# Patient Record
Sex: MALE | Race: WHITE
[De-identification: names, ages, dates, MRNs, and addresses within clinical notes are randomized per-mention and may not be internally consistent; named-entity substitution may affect disease eponyms.]

---

## 2017-11-11 ENCOUNTER — HOSPITAL ENCOUNTER (INPATIENT)
Dept: HOSPITAL 45 - C.EDB | Age: 67
LOS: 2 days | Discharge: HOME HEALTH | DRG: 561 | End: 2017-11-13
Payer: COMMERCIAL

## 2017-11-11 VITALS
HEIGHT: 71 IN | WEIGHT: 216.27 LBS | HEIGHT: 71 IN | WEIGHT: 216.27 LBS | BODY MASS INDEX: 30.28 KG/M2 | BODY MASS INDEX: 30.28 KG/M2
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
## 2017-11-11 DIAGNOSIS — Z79.82: ICD-10-CM

## 2017-11-11 DIAGNOSIS — Z96.651: ICD-10-CM

## 2017-11-11 DIAGNOSIS — T84.020A: Primary | ICD-10-CM

## 2017-11-11 DIAGNOSIS — Z88.2: ICD-10-CM

## 2017-11-11 DIAGNOSIS — Z96.641: ICD-10-CM
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
## 2017-11-11 NOTE — EMERGENCY ROOM VISIT NOTE
History


Report prepared by Delaney:  Dorcas Rome


Under the Supervision of:  Dr. Isadora Rosas D.O.


First contact with patient:  23:19


Chief Complaint:  REFERRED BY DOCTOR


Stated Complaint:  RIGHT HIP/KNEE POSS OUT OF PLACE. HAD SURG 11/9





History of Present Illness


The patient is a 67 year old male who presents to the Emergency Room with 

complaints of a possible right hip and knee dislocation occurring this 

afternoon. The patient was going to the bathroom when he slipped and his right 

knee "went inward".  He denies falling to the ground or any trauma. The patient 

has a history of knee surgery a year ago and [facility] His hip was replaced because he had significant 

degeneration to the site. He has pain on the outside of his right knee which 

worsens when he puts weight on it. The patient last ate about 6 hours ago and 

notes taking tramadol and celeBREX today.  He denies having any other medical 

problems.





   Source of History:  patient


   Onset:  this afternoon


   Position:  knee (right), other (hip)


   Quality:  other (dislocation)


   Modifying Factors (Worsening):  other (putting weight on right leg)





Review of Systems


See HPI for pertinent positives & negatives. A total of 10 systems reviewed and 

were otherwise negative.





Past Medical & Surgical


Medical Problems:


(1) Degenerative joint disease of right hip


Surgical Problems:


(1) S/P TKR (total knee replacement)








Family History


no pertinent family history stated.





Social History


Smoking Status:  Never Smoker


Marital Status:  





Current/Historical Medications


Scheduled


Acetaminophen (Tylenol), 1,000 MG PO Q8


Ascorbic Acid (Vitamin C), 500 MG PO BID


Aspirin (Aspirin Ec), 81 MG PO BID


Atorvastatin (Lipitor), 40 MG PO QAM


Celecoxib (CeleBREX), 200 MG PO BID


Coenzyme Q10 (Ubidecarenone) (Coq-10), 100 MG PO QPM


Ezetimibe (Zetia), 10 MG PO QAM


Fish Oil (Omega-3), 1,200 MG PO BID


Glucosamine-Chondroitin-Vit C- (Glucosamine Chondroitin), 1 TAB PO BID


Multivitamin (Multivitamin), 1 TAB PO QPM


Ranolazine (Ranexa), 500 MG PO BID


Tamsulosin Hcl (Flomax), 0.4 MG PO QAM





Scheduled PRN


Ondansetron Hcl (Zofran), 8 MG PO Q8 PRN for Nausea


Tramadol (Ultram),  MG PO Q4H PRN for Pain





Allergies


Coded Allergies:  


     Iodine (Verified  Allergy, Unknown, BLISTERS WITH TOPICAL IODINE, 11/9/17)


 OK WITH IVP DYE AND CAN EAT SHELLFISH PER PT


     Penicillins (Verified  Allergy, Unknown, UNKNOWN, 11/9/17)


     Sulfa Antibiotics (Verified  Allergy, Unknown, UNKNOWN, 11/9/17)





Physical Exam


Vital Signs











  Date Time  Temp Pulse Resp B/P (MAP) Pulse Ox O2 Delivery O2 Flow Rate FiO2


 


11/11/17 23:08 36.4 68 18 135/76 98 Room Air  











Physical Exam


HEENT: Head - normocephalic and atraumatic   Pupils are equal, round, and 

reactive to light.  Extraocular eye muscles are intact, and sclera are 

anicteric.   Nose -  moist nasal mucosa without discharge. Mouth - moist buccal 

mucosa.  Oropharynx is nonerythematous and there is no tonsillar exudate or 

edema noted.


Neck: Supple; no JVD, nuchal rigidity, cervical lymphadenopathy, or auscultated 

bruits.


Heart: Regular rate and rhythm.  There is a normal S1 and S2 with no murmurs, 

clicks, or gallops appreciated.


Lungs: Clear to auscultation bilaterally with no wheezes, rales, or rhonchi.


Abdomen: Soft, completely nontender, nondistended, with good bowel sounds.  

There are no palpable pulsatile masses or hepatosplenomegaly.  There is no 

guarding, rigidity, or rebound noted.


Extremities: Right lower extremity shortened and turned inward. Pain to 

proximal tibia and distal femur. No evidence of cyanosis, clubbing, or edema.  

There are easily palpable peripheral pulses.


Skin: warm and dry with good turgor and no rashes.





Medical Decision & Procedures


ER Provider


Diagnostic Interpretation:


Radiology results as stated below per my review and the radiologist's 

interpretation: 





RIGHT HIP XRAY: Dislocated prothesis. 





RIGHT KNEE XRAY: unremarkable, hardware in place, no fracture.





Laboratory Results


Laboratory results per my review.





ED Course


2321: Past medical records reviewed. The patient was evaluated in room B9. A 

complete history and physical exam was performed.  The patient went for plain 

films of his right knee and his right hip as described above.





0015: I updated the patient on his X-ray results.  He declined wanting anything 

for pain.





0020: Discussed the patient's case with Dr. [facility]. The 

patient will be evaluated for further management.





Medical Decision


The patient is a 67 year old male who presents to the Emergency Room with 

complaints of a possible right hip and knee dislocation occurring this 

afternoon. Differential diagnosis includes prosthetic hip replacement, knee 

fracture, knee dislocation. 





The patient was discharged from the hospital today after having a right hip 

replacement.  Unfortunately, the patient has dislocated his prosthesis.  I 

discussed the case with Dr. Waddell and he states the patient to the OR this 

morning.  The patient will be kept nothing by mouth.





Medication Reconcilliation


Current Medication List:  was personally reviewed by me





Blood Pressure Screening


Patient's blood pressure:  Elevated blood pressure


Blood pressure disposition:  Referred to PCP (evaluated by hospitalist)





Consults


Time Called:  0017


Consulting Physician:  Dr. [facility]


Returned Call:  0020


Discussed the patient's case with Dr. Vic New. The 

patient will be evaluated for further management.





Impression





 Primary Impression:  


 Dislocation of hip joint prosthesis





Scribe Attestation


The scribe's documentation has been prepared under my direction and personally 

reviewed by me in its entirety. I confirm that the note above accurately 

reflects all work, treatment, procedures, and medical decision making performed 

by me.





Departure Information


Dispostion


Being Evaluated By Hospitalist





Referrals


Constantine Greenberg M.D. (PCP)





Patient Instructions


[facility]





Problem Qualifiers








 Primary Impression:  


 Dislocation of hip joint prosthesis


 Encounter type:  initial encounter  Qualified Codes:  T84.029A - Dislocation 

of unspecified internal joint prosthesis, initial encounter; Z96.649 - Presence 

of unspecified artificial hip joint

## 2017-11-12 VITALS
TEMPERATURE: 98.06 F | SYSTOLIC BLOOD PRESSURE: 146 MMHG | DIASTOLIC BLOOD PRESSURE: 77 MMHG | OXYGEN SATURATION: 96 % | HEART RATE: 77 BPM

## 2017-11-12 VITALS
TEMPERATURE: 97.7 F | DIASTOLIC BLOOD PRESSURE: 79 MMHG | SYSTOLIC BLOOD PRESSURE: 133 MMHG | OXYGEN SATURATION: 96 % | HEART RATE: 74 BPM

## 2017-11-12 VITALS
SYSTOLIC BLOOD PRESSURE: 160 MMHG | OXYGEN SATURATION: 99 % | DIASTOLIC BLOOD PRESSURE: 80 MMHG | HEART RATE: 75 BPM | TEMPERATURE: 97.52 F

## 2017-11-12 VITALS — DIASTOLIC BLOOD PRESSURE: 80 MMHG | HEART RATE: 79 BPM | SYSTOLIC BLOOD PRESSURE: 147 MMHG | OXYGEN SATURATION: 98 %

## 2017-11-12 VITALS
DIASTOLIC BLOOD PRESSURE: 69 MMHG | TEMPERATURE: 97.34 F | SYSTOLIC BLOOD PRESSURE: 118 MMHG | HEART RATE: 71 BPM | OXYGEN SATURATION: 97 %

## 2017-11-12 VITALS
SYSTOLIC BLOOD PRESSURE: 145 MMHG | OXYGEN SATURATION: 99 % | HEART RATE: 64 BPM | TEMPERATURE: 97.52 F | DIASTOLIC BLOOD PRESSURE: 81 MMHG

## 2017-11-12 VITALS
OXYGEN SATURATION: 98 % | SYSTOLIC BLOOD PRESSURE: 123 MMHG | DIASTOLIC BLOOD PRESSURE: 74 MMHG | TEMPERATURE: 97.88 F | HEART RATE: 65 BPM

## 2017-11-12 VITALS
SYSTOLIC BLOOD PRESSURE: 134 MMHG | TEMPERATURE: 98.06 F | HEART RATE: 70 BPM | DIASTOLIC BLOOD PRESSURE: 80 MMHG | OXYGEN SATURATION: 97 %

## 2017-11-12 VITALS — SYSTOLIC BLOOD PRESSURE: 119 MMHG | DIASTOLIC BLOOD PRESSURE: 76 MMHG | OXYGEN SATURATION: 97 % | HEART RATE: 64 BPM

## 2017-11-12 LAB
BASOPHILS # BLD: 0.03 K/UL (ref 0–0.2)
BASOPHILS NFR BLD: 0.3 %
EOS ABS #: 0.16 K/UL (ref 0–0.5)
EOSINOPHIL NFR BLD AUTO: 221 K/UL (ref 130–400)
HCT VFR BLD CALC: 41.5 % (ref 42–52)
HGB BLD-MCNC: 13.7 G/DL (ref 14–18)
IG#: 0.05 K/UL (ref 0–0.02)
IMM GRANULOCYTES NFR BLD AUTO: 20.9 %
INR PPP: 0.9 (ref 0.9–1.1)
LYMPHOCYTES # BLD: 2.21 K/UL (ref 1.2–3.4)
MCH RBC QN AUTO: 29 PG (ref 25–34)
MCHC RBC AUTO-ENTMCNC: 33 G/DL (ref 32–36)
MCV RBC AUTO: 87.7 FL (ref 80–100)
MONO ABS #: 1.18 K/UL (ref 0.11–0.59)
MONOCYTES NFR BLD: 11.2 %
NEUT ABS #: 6.94 K/UL (ref 1.4–6.5)
NEUTROPHILS # BLD AUTO: 1.5 %
NEUTROPHILS NFR BLD AUTO: 65.6 %
PMV BLD AUTO: 9.9 FL (ref 7.4–10.4)
PTT PATIENT: 26.3 SECONDS (ref 21–31)
RED CELL DISTRIBUTION WIDTH CV: 17.1 % (ref 11.5–14.5)
RED CELL DISTRIBUTION WIDTH SD: 54.7 FL (ref 36.4–46.3)
WBC # BLD AUTO: 10.57 K/UL (ref 4.8–10.8)

## 2017-11-12 PROCEDURE — 0SS9XZZ REPOSITION RIGHT HIP JOINT, EXTERNAL APPROACH: ICD-10-PCS

## 2017-11-12 RX ADMIN — Medication SCH GM: at 20:38

## 2017-11-12 RX ADMIN — SODIUM CHLORIDE SCH MLS/HR: 900 INJECTION, SOLUTION INTRAVENOUS at 01:41

## 2017-11-12 RX ADMIN — OXYCODONE HYDROCHLORIDE AND ACETAMINOPHEN SCH MG: 500 TABLET ORAL at 20:38

## 2017-11-12 RX ADMIN — CELECOXIB SCH MG: 200 CAPSULE ORAL at 20:39

## 2017-11-12 RX ADMIN — RANOLAZINE SCH MG: 500 TABLET, FILM COATED, EXTENDED RELEASE ORAL at 20:38

## 2017-11-12 RX ADMIN — SODIUM CHLORIDE SCH MLS/HR: 900 INJECTION, SOLUTION INTRAVENOUS at 10:03

## 2017-11-12 RX ADMIN — SODIUM CHLORIDE SCH MLS/HR: 900 INJECTION, SOLUTION INTRAVENOUS at 17:52

## 2017-11-12 RX ADMIN — Medication SCH MG: at 20:38

## 2017-11-12 NOTE — OPERATIVE REPORT
DATE OF OPERATION:  11/12/2017

 

PREOPERATIVE DIAGNOSIS:  Right dislocated prosthetic hip.

 

POSTOPERATIVE DIAGNOSIS:  Same.

 

PROCEDURE:  Right closed reduction prosthetic hip dislocation.

 

SURGEON:  Jimmy Waddell DO

 

FIRST ASSISTANT:  None.

 

ANESTHESIA:  General mask with monitored anesthesia care.

 

SPECIMENS:  None.

 

DRAINS:  None.

 

COMPLICATIONS:  None.

 

BLOOD LOSS:  Zero.

 

PERTINENT HISTORY:  This is a 67-year-old gentleman who had a right total hip

replacement performed by Dr. Jessica on Thursday of this week, which was 3

days ago.  The patient had been discharged from the hospital yesterday, which

was postop day #2 with no difficulties, no acute distress and good progress

with physical therapy.  The patient then noted some discomfort and pain with

inability to extend or flex the right hip with apparent shortening of the

right lower extremity when he returned back to his home in [place].  The

patient had been getting on to or off of the toilet and at that point he

noticed pain in the hip and the knee and difficulty with range of motion.  He

was then instructed to follow up at [facility] for further

care and management.  He presented to the ER, had x-rays, noted dislocation

of the right hip, and was then admitted to the hospital for preparation for

closed reduction, potential surgical intervention.

 

All potential risks, benefits, complications, alternatives, rehab, potential

for incomplete relief of symptoms, need for further surgery, DVT, PE, death,

persistent pain, swelling, scarring, weakness, neurovascular injury, wound

complications, possible bone fracture, possible damage to the prosthetic

implants or need for an open reduction was discussed with the patient and his

wife.  They both decided to proceed with the procedure as indicated.

 

DESCRIPTION OF PROCEDURE:  The patient was taken to the operative suite,

placed supine on the operating room table.  After review of the consent and

identification of proper operative site, the patient was then anesthetized

and mask airway was maintained with monitored anesthesia care.  Next, the

right hip was then assessed and noted to be shortened and internally rotated

compared to the nonoperative left hip.  Next, a gentle reduction maneuver

under steady gentle traction was then performed with a palpable clunk with

reduction of the right hip prosthesis.  Next, range of motion was attempted

and noted to be markedly improved with full range of motion with no

impingement.  Post-reduction radiographs intraoperatively demonstrated

concentric reduction of the femoral head component into the acetabular

component in both AP and cross-table lateral projections.  There was no

evidence of any bone fracture, rotation of the implants or loosening of the

implants.  Next, an abduction pillow brace was placed between the patient's

legs.  He was awakened and then taken to recovery in stable condition.

 

 

I attest to the content of the Intraoperative Record and any orders documented therein. Any exception
s are noted below.

## 2017-11-12 NOTE — DIAGNOSTIC IMAGING REPORT
R KNEE 1 OR 2 VIEWS ROUTINE



HISTORY:  67 years-old Male eval for trauma acute right knee pain status post

trauma



COMPARISON: Right knee radiographs 12/20/2016



TECHNIQUE: AP and lateral views of the right knee



FINDINGS: 

Right hip arthroplasty and patellar resurfacing changes noted without hardware

complication identified. Marginal spurring is seen about the patella. There is a

moderate-sized joint effusion. No acute fracture or dislocation. Negative for

opaque foreign body. Mild soft tissue swelling about the knee.



IMPRESSION: 

1. Mild soft tissue swelling and moderate joint effusion without acute fracture

or dislocation.

2. Right knee arthroplasty without complication. 







The above report was generated using voice recognition software. It may contain

grammatical, syntax or spelling errors.







Electronically signed by:  Kurt Corrales M.D.

11/12/2017 6:27 AM



Dictated Date/Time:  11/12/2017 6:26 AM

## 2017-11-12 NOTE — HISTORY AND PHYSICAL
History & Physical


Date


Nov 12, 2017.





Chief Complaint


Right hip pain





History of Present Illness


The patient is a 67 year old male with complaints of right hip pain and leg 

rotation after sitting on his toilet last evening. He had a right STEPHANIE performed 

by Dr. Jessica on 11.9.17 and was discharged  yesterday 11.11.17. He considered 

putting on the elevated toilet seat, however, he thought the seat would be high 

enough. After raising himself from the toilet, he was unable to ambulate and he 

had pain in the posterior right hip and the right knee. His wife brought him to 

[facility] ER where x-rays confirmed the dislocated right STEPHANIE.





Past Medical/Surgical History


Medical Problems:


(1) Degenerative joint disease of right hip


Surgical Problems:


(1) S/P TKR (total knee replacement)





Allergies


Coded Allergies:  


     Iodine (Verified  Allergy, Unknown, BLISTERS WITH TOPICAL IODINE, 11/9/17)


 OK WITH IVP DYE AND CAN EAT SHELLFISH PER PT


     Penicillins (Verified  Allergy, Unknown, UNKNOWN, 11/9/17)


     Sulfa Antibiotics (Verified  Allergy, Unknown, UNKNOWN, 11/9/17)





Home Medications


Scheduled


Acetaminophen (Tylenol), 1,000 MG PO Q8


Ascorbic Acid (Vitamin C), 500 MG PO BID


Aspirin (Aspirin Ec), 81 MG PO BID


Atorvastatin (Lipitor), 40 MG PO QAM


Celecoxib (CeleBREX), 200 MG PO BID


Coenzyme Q10 (Ubidecarenone) (Coq-10), 100 MG PO QPM


Ezetimibe (Zetia), 10 MG PO QAM


Fish Oil (Omega-3), 1,200 MG PO BID


Glucosamine-Chondroitin-Vit C- (Glucosamine Chondroitin), 1 TAB PO BID


Multivitamin (Multivitamin), 1 TAB PO QPM


Ranolazine (Ranexa), 500 MG PO BID


Tamsulosin Hcl (Flomax), 0.4 MG PO QAM





Scheduled PRN


Ondansetron Hcl (Zofran), 8 MG PO Q8 PRN for Nausea


Tramadol (Ultram),  MG PO Q4H PRN for Pain





Physical Examination


Skin:  warm/dry, no rash


Eyes:  normal inspection


ENT:  normal ENT inspection


Head:  normocephalic, atraumatic


Neck:  supple, trachea midline


Respiratory/Chest:  lungs clear, normal breath sounds, no respiratory distress


Cardiovascular:  regular rate, rhythm, no murmur


Abdomen / GI:  normal bowel sounds, non tender


Neurologic/Psych:  no motor/sensory deficits, alert, oriented x 3





Diagnosis


Dislocated right STEPHANIE


3 days post op right STEPHANIE





Plan of Treatment


Recommend a closed reduction right STEPHANIE dislocation.


All potential risks, benefits, complications, alternatives, and rehab have been 

discussed with the patient and he wishes to proceed.


He will be taken to the OR today for reduction.

## 2017-11-12 NOTE — ANESTHESIOLOGY PROGRESS NOTE
Anesthesia Post Op Note


Date & Time


Nov 12, 2017 at 13:10





Vital Signs


Pain Intensity:  0.0





Vital Signs Past 12 Hours








  Date Time  Temp Pulse Resp B/P (MAP) Pulse Ox O2 Delivery O2 Flow Rate FiO2


 


11/12/17 12:46 36.4 64 14 145/81 (102) 99 Room Air  


 


11/12/17 12:45     99 Room Air  


 


11/12/17 12:25 36.8 67 16 127/69 95 Room Air  


 


11/12/17 12:15  65 16 112/62 100 Oxymask 10 


 


11/12/17 12:07 36.6 62 16 107/64 100 Oxymask 10 


 


11/12/17 07:20      Room Air  


 


11/12/17 07:02 36.7 70 18 134/80 (98) 97 Room Air  


 


11/12/17 01:38  76 16 135/76 97   


 


11/12/17 01:37 36.4 75 18 160/80 99 Room Air  


 


11/12/17 01:20      Room Air  











Notes


Mental Status:  alert / awake / arousable, participated in evaluation


Pt Amnestic to Procedure:  Yes


Nausea / Vomiting:  adequately controlled


Pain:  adequately controlled


Airway Patency, RR, SpO2:  stable & adequate


BP & HR:  stable & adequate


Hydration State:  stable & adequate


Anesthetic Complications:  no major complications apparent

## 2017-11-12 NOTE — DIAGNOSTIC IMAGING REPORT
R HIP OR FILMS



HISTORY:  67 years-old Male CLOSED REDUCTION RT HIP status post reduction of

right hip dislocation



COMPARISON: Right hip radiographs 11/11/2017



TECHNIQUE: 3 spot fluoroscopic images of the right hip were obtained utilizing

14.3 seconds fluoroscopy time.



FINDINGS: 

There has been interval reduction of the previously noted dislocated right femur

in relation to the acetabulum. Right hip arthroplasty hardware is again noted

without periprosthetic fracture or malalignment.



IMPRESSION: Improved alignment status post reduction as above. 







The above report was generated using voice recognition software. It may contain

grammatical, syntax or spelling errors.







Electronically signed by:  Kurt Corrales M.D.

11/12/2017 4:33 PM



Dictated Date/Time:  11/12/2017 4:32 PM

## 2017-11-12 NOTE — [facility] POST OPERATIVE BRIEF NOTE
Immediate Operative Summary


Operative Date


Nov 12, 2017.





Pre-Operative Diagnosis





Right Prosthetic Hip Dislocation





Post-Operative Diagnosis





Right Prosthetic Hip Dislocation





Procedure(s) Performed





Closed Reduction Right Prosthetic Hip Dislocation





Surgeon


Dr. Waddell





Assistant Surgeon(s)


NONE





Estimated Blood Loss


0 ml





Findings


See Dict





Specimens





NONE per surgeon





Drains


None





Anesthesia


General mask MAC





Complication(s)


None





Disposition


Recovery Room / PACU

## 2017-11-12 NOTE — DIAGNOSTIC IMAGING REPORT
R HIP UNILATERAL 2 VIEWS



HISTORY:  67 years-old Male post-reduction status post reduction of right hip

dislocation



COMPARISON: Right hip radiographs 11/11/2017 at 11:45 PM



TECHNIQUE: 2 views of the right hip



FINDINGS: 

Status post reduction of right hip dislocation. Right hip arthroplasty hardware

appears intact. No periprosthetic fracture. Brachytherapy seeds in the prostate

are partially imaged. There are degenerative changes of the right SI joint.



IMPRESSION: Satisfactory alignment of right hip arthroplasty. 







The above report was generated using voice recognition software. It may contain

grammatical, syntax or spelling errors.







Electronically signed by:  Kurt Corrales M.D.

11/12/2017 5:05 PM



Dictated Date/Time:  11/12/2017 5:04 PM

## 2017-11-12 NOTE — DIAGNOSTIC IMAGING REPORT
R HIP UNILATERAL 2 VIEWS



HISTORY:  67 years-old Male eval for prosthesis dislocation concern for

prosthetic dislocation right hip. Acute right hip pain



COMPARISON: Pelvis right hip radiographs 11/9/2017



TECHNIQUE: AP view the pelvis with frog-leg view of the right hip



FINDINGS: 

Right hip arthroplasty noted with acute dislocation of the femoral head

component displaced 4.6 cm superiorly and 3.3 cm laterally in relation to the

acetabular component. No periprosthetic fracture identified. Postsurgical soft

tissue swelling is again seen about the right hip. Brachytherapy seeds in the

prostate noted.



IMPRESSION: Right hip arthroplasty with acute superior lateral dislocation of

the femoral head component in relation to the acetabulum.







The above report was generated using voice recognition software. It may contain

grammatical, syntax or spelling errors.







Electronically signed by:  Kurt Corrales M.D.

11/12/2017 6:26 AM



Dictated Date/Time:  11/12/2017 6:24 AM

## 2017-11-13 VITALS
TEMPERATURE: 97.88 F | OXYGEN SATURATION: 95 % | HEART RATE: 68 BPM | DIASTOLIC BLOOD PRESSURE: 75 MMHG | SYSTOLIC BLOOD PRESSURE: 124 MMHG

## 2017-11-13 VITALS — OXYGEN SATURATION: 94 %

## 2017-11-13 VITALS
DIASTOLIC BLOOD PRESSURE: 78 MMHG | OXYGEN SATURATION: 94 % | SYSTOLIC BLOOD PRESSURE: 138 MMHG | HEART RATE: 72 BPM | TEMPERATURE: 98.06 F

## 2017-11-13 VITALS
SYSTOLIC BLOOD PRESSURE: 138 MMHG | DIASTOLIC BLOOD PRESSURE: 78 MMHG | OXYGEN SATURATION: 94 % | HEART RATE: 72 BPM | TEMPERATURE: 98.06 F

## 2017-11-13 RX ADMIN — Medication SCH MG: at 09:01

## 2017-11-13 RX ADMIN — CELECOXIB SCH MG: 200 CAPSULE ORAL at 09:02

## 2017-11-13 RX ADMIN — RANOLAZINE SCH MG: 500 TABLET, FILM COATED, EXTENDED RELEASE ORAL at 09:02

## 2017-11-13 RX ADMIN — Medication SCH GM: at 09:02

## 2017-11-13 RX ADMIN — OXYCODONE HYDROCHLORIDE AND ACETAMINOPHEN SCH MG: 500 TABLET ORAL at 09:02

## 2017-11-13 RX ADMIN — SODIUM CHLORIDE SCH MLS/HR: 900 INJECTION, SOLUTION INTRAVENOUS at 01:45

## 2017-11-13 RX ADMIN — SODIUM CHLORIDE SCH MLS/HR: 900 INJECTION, SOLUTION INTRAVENOUS at 09:45

## 2017-11-13 NOTE — DISCHARGE INSTRUCTIONS
Discharge Instructions


Date of Service


Nov 13, 2017.





Admission


Reason for Admission:  Dislocated Right Hip





Discharge


Discharge Diagnosis / Problem:  SP CLOSED REDUCTION





Discharge Goals


Goal(s):  Decrease discomfort, Improve function, Increase independence





Activity Recommendations


Activity Limitations:  per Instructions/Follow-up section





.





Instructions / Follow-Up


Instructions / Follow-Up


ACTIVITY RECOMMENDATIONS:





SELF CARE INSTRUCTIONS AFTER TOTAL HIP REPLACEMENT





Until the incision and soft tissues around your hip have healed, there is


a possibility that the hip prosthesis could dislocate.





A.  Observe the following precautions to prevent dislocation:


   1.  Don't bend your hip greater than 90 degrees.


   2.  Avoid crossing your legs or ankles while standing or lying.


   3.  Sit with your feet placed 6 inches apart.


   4.  When sitting, keep your knees below your hips.  Sit on a firm 


        surface, avoid deep, soft chairs and couches.  Use an elevated


        toilet seat in the bathroom.


   5.  Don't bend over at the waist.  Use a long handled shoehorn and


        a sock aid to help you put on your shoes and socks.  A reacher


        can help you  objects that are too high or too low to reach.


   6.  Keep car riding to a minimum for at least one month after surgery.





B.  Your balance may be shaky for a while.  Use crutches or a walker until 


     directed by your doctor.





C.  Use hand rails when walking on stairs.





D.  Wear low heeled shoes with non-slip soles.





E.  Be sure that your floors are free of things that could trip you - throw rugs

,


     electrical cords, small objects.  Avoid wet and waxed floors, especially 

with


     crutches and canes.





F.  Try to walk several times a day with rest periods between.





G.  Continue with all the exercises taught to you in the hospital.  Again, make 


     walking a part of your daily routine.








SPECIAL CARE INSTRUCTIONS:





**VERY IMPORTANT TO READ AND REVIEW**





A.  You may still be at risk for phlebitis and blood clots.





   1.  Wear surgical stockings (BASSEM hose) for 2 weeks after surgery to improve


        circulation and reduce swelling.


   2.  Take Aspirin 81mg twice daily for 4 weeks or as directed by your doctor.

  This


                 is your blood thinner.


   3.  High risk patients may be prescribed a stronger blood thinner if 

necessary.


   4.  If you are on Coumadin normally, your family doctor/cardiologist should 

monitor 


                 your blood work.  Expect a phone call the day of or the day 

after bloodwork is


                 drawn to adjust your dosage.





B.  You must take antibiotics before having dental work, bladder, bowel and 

other


     surgery.  Your doctor will provide you with a permanent card to carry 

describing 


     precautions.  





C.  Call [facility] if you have a fever, redness or swelling 

around


     the incision, cloudy drainage from incision, or sudden increase in pain in 

your hip, not


     relieved by your regular pain medication.  





D.  Please call the office at [phone] if you have any concerns or 

questions about


     your operation or recovery.





*  YOU MAY SHOWER, NO TUB BATHS UNTIL CLEARED BY YOUR DOCTOR.





*  WEAR BASSEM HOSE 20 HOURS PER DAY FOR 2 WEEKS.





*  YOU SHOULD USE A WALKER OR CRUTCHES FOR 2-4 WEEKS.  THIS WILL HELP PREVENT


    STRAIN ON YOUR HIP MUSCLE AND ALLOW IT TO HEAL PROPERLY. YOU MAY WEAN TO A


    CANE AS TOLERATED.





*  MOST PATIENTS WILL HAVE HOME NURSING FOR THERAPY.  IF YOU DECIDE TO DO 


   OUTPATIENT PHYSICAL THERAPY, PLEASE SCHEDULE THIS 3 TIMES PER WEEK.





*  YOU MAY HAVE A LARGE, BAND-GIFTY LIKE DRESSING (SILVERON).  THIS WILL REMAIN 

ON 


   YOUR INCISION FOR 7 DAYS, THEN CAN BE REMOVED.  IF INCISION IS LEAKING 

THROUGH 


   DRESSING, PLEASE CALL THE OFFICE [phone].   





FOLLOW UP VISIT:





If appointment is not already scheduled:





Please call [facility] to make a follow-up appointment for 


2 weeks after your surgery at [phone].





Current Hospital Diet


Patient's current hospital diet: Regular Diet





Discharge Diet


Recommended Diet:  Regular Diet





Procedures


Procedures Performed:  


Closed Reduction Right Prosthetic Hip Dislocation





Pending Studies


Studies pending at discharge:  no





Laboratory Results





Hemoglobin A1c








Test


  10/27/17


12:57 Range/Units


 


 


Estimated Average Glucose 117   mg/dl


 


Hemoglobin A1c 5.7 H 4.5-5.6  %











Medical Emergencies








.


Who to Call and When:





Medical Emergencies:  If at any time you feel your situation is an emergency, 

please call 911 immediately.





.





Non-Emergent Contact


Non-Emergency issues call your:  Surgeon


.








"Provider Documentation" section prepared by Jennifer M. Illig.








.





VTE Core Measure


Inpt VTE Proph given/why not?:  Other Anticoagulation, T.E.D. Stockings, SCD's

## 2017-11-15 NOTE — DISCHARGE SUMMARY
Orthopedic Discharge Summary


Admission Date/Reason


Nov 12, 2017 at 00:33


Dislocated Right Hip.





Discharge Date/Disposition


Nov 13, 2017


Home





Diagnosis


Principal Diagnosis:


Dislocated right STEPHANIE





Procedure(s) Performed


Right closed reduction prosthetic hip dislocation





Medication Reconciliation


Continued Medications:  


Acetaminophen (Tylenol) 500 Mg Tab


1000 MG PO Q8, TAB





Ascorbic Acid (Vitamin C) 500 Mg Cap


500 MG PO BID





Aspirin (Aspirin Ec) 81 Mg Tab


81 MG PO BID





Atorvastatin (Lipitor) 40 Mg Tab


40 MG PO QAM, TAB





Celecoxib (CeleBREX) 200 Mg Cap


200 MG PO BID, CAP





Coenzyme Q10 (Ubidecarenone) (Coq-10) 100 Mg Cap


100 MG PO QPM





Ezetimibe (Zetia) 10 Mg Tab


10 MG PO QAM, TAB





Fish Oil (Omega-3) 1 Ea Cap


1200 MG PO BID, CAP





Glucosamine-Chondroitin-Vit C- (Glucosamine Chondroitin) 1 Tab Tab


1 TAB PO BID





Multivitamin (Multivitamin)  Tab


1 TAB PO QPM, TAB





Ondansetron Hcl (Zofran) 8 Mg Tab


8 MG PO Q8 PRN for Nausea, TAB





Ranolazine (Ranexa) 500 Mg Tab


500 MG PO BID





Tamsulosin Hcl (Flomax) 0.4 Mg Cap


0.4 MG PO QAM, CAP





Tramadol (Ultram) 50 Mg Tab


 MG PO Q4H PRN for Pain, TAB











Admission Physical Exam


As per Admitting History & Physical.





Hospital Course


The patient had been discharge on 11.11.17 on POD #2 from a right STEPHANIE. Later 

that evening, the patient was sitting on his toilet but started having pain and 

lack of function with the right hip when trying to stand up. X-rays at the ER 

later that evening demonstrated a dislocated STEPHANIE. On 11.12.17, the patient was 

taken to the OR to reduce the dislocated hip. On 11.13.17, he was doing well 

and was d/c'd home.





Discharge Instructions


Please refer to the electronic Patient Visit Report (Discharge Instructions) 

for additional information.

## 2018-01-09 ENCOUNTER — HOSPITAL ENCOUNTER (EMERGENCY)
Dept: HOSPITAL 45 - C.EDB | Age: 68
LOS: 1 days | Discharge: HOME | End: 2018-01-10
Payer: COMMERCIAL

## 2018-01-09 VITALS
HEIGHT: 70.98 IN | WEIGHT: 222.67 LBS | BODY MASS INDEX: 31.17 KG/M2 | WEIGHT: 222.67 LBS | HEIGHT: 70.98 IN | BODY MASS INDEX: 31.17 KG/M2
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
## 2018-01-09 DIAGNOSIS — Z79.82: ICD-10-CM

## 2018-01-09 DIAGNOSIS — T84.020A: Primary | ICD-10-CM
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
## 2018-01-10 VITALS — SYSTOLIC BLOOD PRESSURE: 131 MMHG | DIASTOLIC BLOOD PRESSURE: 74 MMHG | OXYGEN SATURATION: 99 % | HEART RATE: 81 BPM

## 2018-01-10 VITALS
SYSTOLIC BLOOD PRESSURE: 120 MMHG | DIASTOLIC BLOOD PRESSURE: 70 MMHG | HEART RATE: 84 BPM | OXYGEN SATURATION: 99 % | TEMPERATURE: 97.7 F

## 2018-01-10 NOTE — DIAGNOSTIC IMAGING REPORT
R HIP UNILATERAL 2 VIEWS



CLINICAL HISTORY: post reduction film   



COMPARISON STUDY:  Right hip 1/19/2018.



FINDINGS: The patient is status post reduction of the right hip dislocation. The

alignment is now intact. There is a right total hip arthroplasty. The hardware

is intact. No fracture or dislocation identified. Multiple brachytherapy seeds

are seen within the prostate gland.



IMPRESSION:  Status post reduction the right hip dislocation. The alignment is

now anatomic. 









Electronically signed by:  Negro Rankin M.D.

1/10/2018 7:11 AM



Dictated Date/Time:  1/10/2018 7:10 AM

## 2018-01-10 NOTE — ORTHOPEDIC CONSULTATION
Orthopedic Consultation


Date of Consultation:


Roby 10, 2018.


Attending Physician:





Reason for Consultation:


Right STEPHANIE dislocation


History of Present Illness


Patient presents to [facility] ED with acute right hip pain, reports bending at his 

waist when he felt a pop in his hip.  Had previous R STEPHANIE on 11/9/17 by Dr. Jessica and previous R STEPHANIE dislocation with closed reduction performed on 11/12/ 17.  Denies pain or issues since his previous hip dislocation.  Denies numbness

, tingling to his RLE.





Past Medical/Surgical History


Medical Problems:


(1) Dislocation of hip joint prosthesis


Status: Acute  











Family History





No pertinent family history





Social History


Smoking Status:  Never Smoker


Smokeless Tobacco Use:  No


Alcohol Use:  occasionally


Drug Use:  none


Marital Status:  


Housing Status:  lives with family, lives with significant other





Allergies


Coded Allergies:  


     Iodine (Verified  Allergy, Unknown, BLISTERS WITH TOPICAL IODINE, 1/10/18)


 OK WITH IVP DYE AND CAN EAT SHELLFISH PER PT


     Penicillins (Verified  Allergy, Unknown, UNKNOWN, 1/10/18)


     Sulfa Antibiotics (Verified  Allergy, Unknown, UNKNOWN, 1/10/18)





Home Medications


Scheduled


Ascorbic Acid (Vitamin C), 500 MG PO BID


Aspirin (Aspirin Ec), 81 MG PO DAILY


Atorvastatin (Lipitor), 40 MG PO QAM


Coenzyme Q10 (Ubidecarenone) (Coq-10), 100 MG PO QPM


Ezetimibe (Zetia), 10 MG PO QAM


Fish Oil (Omega-3), 1,200 MG PO BID


Glucosamine-Chondroitin-Vit C- (Glucosamine Chondroitin), 1 TAB PO BID


Multivitamin (Multivitamin), 1 TAB PO QPM


Ranolazine (Ranexa), 500 MG PO BID


Tamsulosin Hcl (Flomax), 0.4 MG PO QAM





Current Inpatient Medications


see med rec





Review of Systems


Review of systems negatives with the exception of those mentioned in the HPI 

above.





Physical Exam











  Date Time  Temp Pulse Resp B/P (MAP) Pulse Ox O2 Delivery O2 Flow Rate FiO2


 


1/10/18 01:05        


 


1/10/18 00:32  74      


 


1/10/18 00:31   18 136/83    


 


1/10/18 00:30  73   100 Room Air  


 


1/10/18 00:29    152/85    


 


1/9/18 22:33 36.3 68 18 148/85 100 Room Air  








NAD, AOx3





RLE NVSI +EHL/FHL/TA/GS SILT grossly, CR< 2 seconds, +2 DP pulse, compartments 

soft NT, short and internally rotated.





Assessment & Plan


R STEPHANIE dislocation


XR demonstrates posterior superior dislocation of the total hip prosthesis, 

without fracture.  





I indicated the patient for closed reduction of his R STEPHANIE.  I explained the 

risks and benefits which include but not limited to repeat dislocation, pain, 

damage to nerves, vessels, soft tissue and bone, failure of the implant, need 

for additional surgery and death.  The patient agreed with treatment planned 

and informed consent was provided.

## 2018-01-10 NOTE — EMERGENCY ROOM VISIT NOTE
History


Report prepared by Delaney:  Felicia Rooney


Under the Supervision of:  Dr. Isadora Rosas D.O.


First contact with patient:  23:04


Chief Complaint:  HIP PAIN


Stated Complaint:  DISLOCATED HIP





History of Present Illness


The patient is a 67 year old male who presents to the Emergency Room with 

complaints of an episode of right hip pain starting just prior to arrival. The 

patient states that he has had a hip replacement and had it dislocate once. He 

states that tonight when he got out of the shower and bent down to put cream on 

his feet, he felt it pop out again. The patient complains of some limping 

recently and hip spasms. The patient denies abdominal pain. The patient notes 

that he is supposed to have an MRI of his left hip tomorrow at 8 AM to make 

sure that necrosis of his hip isn't set in like it had in his replacement. He 

notes that he just saw Dr. Jessica two weeks ago who said he looked good. The 

patient states that he last ate 5 hours ago and reports calling the orthopedist 

before coming in.





   Source of History:  patient


   Onset:  prior to arrival


   Position:  other (right hip)


   Quality:  other (popped out)


   Timing:  other (episode)


   Associated Symptoms:  No abdominal pain


Note:


The patient complains of recent limping and his hip having spasms.





Review of Systems


See HPI for pertinent positives & negatives. A total of 10 systems reviewed and 

were otherwise negative.





Past Medical & Surgical


Medical Problems:


(1) Degenerative joint disease of right hip


Surgical Problems:


(1) S/P TKR (total knee replacement)








Family History





No pertinent family history





Social History


Smoking Status:  Never Smoker


Marital Status:  


Housing Status:  lives with significant other





Current/Historical Medications


Scheduled


Ascorbic Acid (Vitamin C), 500 MG PO BID


Aspirin (Aspirin Ec), 81 MG PO DAILY


Atorvastatin (Lipitor), 40 MG PO QAM


Coenzyme Q10 (Ubidecarenone) (Coq-10), 100 MG PO QPM


Ezetimibe (Zetia), 10 MG PO QAM


Fish Oil (Omega-3), 1,200 MG PO BID


Glucosamine-Chondroitin-Vit C- (Glucosamine Chondroitin), 1 TAB PO BID


Multivitamin (Multivitamin), 1 TAB PO QPM


Ranolazine (Ranexa), 500 MG PO BID


Tamsulosin Hcl (Flomax), 0.4 MG PO QAM





Allergies


Coded Allergies:  


     Iodine (Verified  Allergy, Unknown, BLISTERS WITH TOPICAL IODINE, 1/10/18)


 OK WITH IVP DYE AND CAN EAT SHELLFISH PER PT


     Penicillins (Verified  Allergy, Unknown, UNKNOWN, 1/10/18)


     Sulfa Antibiotics (Verified  Allergy, Unknown, UNKNOWN, 1/10/18)





Physical Exam


Vital Signs











  Date Time  Temp Pulse Resp B/P (MAP) Pulse Ox O2 Delivery O2 Flow Rate FiO2


 


1/10/18 01:56   20     


 


1/10/18 01:30  81  131/74 99 Room Air  


 


1/10/18 01:26  75 18  96   


 


1/10/18 01:21  77 20 126/70 99 Room Air  


 


1/10/18 01:11    130/72    


 


1/10/18 01:06  78 16  96 Room Air  


 


1/10/18 01:05 36.5 84 18 120/70 99 Room Air  


 


1/10/18 01:05        


 


1/10/18 01:02    128/99    


 


1/10/18 00:51    120/70 99 Nasal Cannula 2.0 


 


1/10/18 00:43    171/71 98 Nasal Cannula 2.0 


 


1/10/18 00:36  76   100 Room Air  


 


1/10/18 00:32  74      


 


1/10/18 00:31   18 136/83    


 


1/10/18 00:30  73   100 Room Air  


 


1/10/18 00:29    152/85    


 


1/9/18 22:33 36.3 68 18 148/85 100 Room Air  











Physical Exam


HEENT: Head - normocephalic and atraumatic   Pupils are equal, round, and 

reactive to light.  Extraocular eye muscles are intact, and sclera are 

anicteric.   Nose -  moist nasal mucosa without discharge. Mouth - moist buccal 

mucosa.  Oropharynx is nonerythematous and there is no tonsillar exudate or 

edema noted.


Neck: Supple; no JVD, nuchal rigidity, cervical lymphadenopathy.


Heart: Regular rate and rhythm.  There is a normal S1 and S2 with no murmurs, 

clicks, or gallops appreciated.


Lungs: Clear to auscultation bilaterally with no wheezes, rales, or rhonchi.


Abdomen: Soft, completely nontender, nondistended, with good bowel sounds.  

There are no palpable pulsatile masses or hepatosplenomegaly.  There is no 

guarding, rigidity, or rebound noted.


Extremities: No evidence of cyanosis, clubbing, or edema.  There are easily 

palpable peripheral pulses. Obvious internal rotation of the right lower 

extremity and shortening. Pain with palpation over the right hip.


Skin: warm and dry with good turgor and no rashes.





Medical Decision & Procedures


ER Provider


Diagnostic Interpretation:


RIGHT HIP X-RAY: The results were interpreted by me. Hip prosthesis dislocated 

superiorly and laterally. 





REPEAT RIGHT HIP X-RAY: The results were interpreted by me. Prosthesis is 

relocated.





Medications Administered











 Medications


  (Trade)  Dose


 Ordered  Sig/Riley


 Route  Start Time


 Stop Time Status Last Admin


Dose Admin


 


 Morphine Sulfate


  (MoRPHine


 SULFATE INJ)  2 mg  NOW  STAT


 IV  1/9/18 23:24


 1/9/18 23:26 DC 1/9/18 23:37


2 MG


 


 Ondansetron HCl


  (Zofran Inj)  2 mg  NOW  STAT


 IV  1/9/18 23:24


 1/9/18 23:26 DC 1/9/18 23:37


2 MG


 


 Morphine Sulfate


  (MoRPHine


 SULFATE INJ)  2 mg  NOW  STAT


 IV  1/10/18 00:01


 1/10/18 00:03 DC 1/10/18 00:11


2 MG











Procedure


Procedural Sedation





Indication Prosthetic hip dislocation.





Total time: 31 minutes.





Written consent was obtained after the risks and benefits were explained to the 

patient, including, but not limited to aspiration, allergic reaction, breathing 

difficulties, cardiac complications, vomiting, pain, event recall, bleeding, and

/or infection.  Pre-sedation examination and paperwork completed.  The patient 

was on 100% oxygen via NRB prior to the procedure. Continous end tidal CO2 

monitoring, pulse oximetry, and cardiac monitoring were utilized. Suction, 

airway equipment, medications, respiratory equipment, and appropriate personnel 

were prepared prior to the initiation of the procedure. A time out was taken.  

Sedation was achieved utilizing 100 mg of Propofol.  After I observed the 

patient had reached the appropriate level of sedation the main procedure was 

performed without complication.  Sedation was discontinued and the monitoring 

continued.  The patient recovered quickly from the effects of the medication 

without complication or adverse event.








2324: Ordered Zofran Inj 2 mg IV, Morphine Sulfate 2 mg IV.





0001: Ordered Morphine Sulfate 2 mg IV.





0040: Ordered Propofol 100 mg IV.





ED Course


2315: Past medical records reviewed. The patient was evaluated in room A10. A 

complete history and physical exam was performed.  An IV lock was initiated





2324: Ordered Zofran Inj 2 mg IV, Morphine Sulfate 2 mg IV.





2358: I discussed the patient's case with Dr. Alin New. He is 

going to come in and evaluate the patient.








0002: I spoke to the patient's nurse who stated that the patient had some 

relief with the Morphine, but not quite enough.  Ordered Morphine Sulfate 2 mg 

IV. 





0004: I reevaluated the patient and updated him on his test results. I informed 

him of what the plan of treatment is and he agreed. 





0035: I revaluated the patient and prepared to perform the sedation procedure.  

I reviewed the risks and benefits of the procedure.  I discussed the case at 

the bedside with the orthopedic surgeon.





0040: Ordered Propofol 100 mg IV.  The patient was on the cardiac monitor, 

pulse oximeter, and end-tidal CO2 monitor.





0045: Time out was taken.





0047: I pushed 100 mg Propofol IV.  Moderate sedation was achieved and the 

patient was easily able to have the right hip dislocation reduced.  

Postreduction films were obtained with good alignment.





0116: The patient was monitored for recovery and the Procedure ended. 





0137: Upon reevaluation, the patient was resting comfortably. I discussed 

findings and results with him. He verbalized agreement of the treatment plan. 

The patient was discharged home.  He was placed into a knee immobilizer





Medical Decision


The patient is a 67 year old male who presents to the Emergency Room with 

complaints of an episode of right hip pain starting just prior to arrival.





Differential diagnoses include hip fracture, prosthesis dislocation, bursitis. 





This is a 67 year old male patient who has a right prosthetic hip.  

Unfortunately the patient bent over this evening and the prosthesis dislocated.





I was able to perform procedural sedation while Dr. Elam reduced the 

dislocation





Medication Reconcilliation


Current Medication List:  was personally reviewed by me





Blood Pressure Screening


Patient's blood pressure:  Normal blood pressure


Blood pressure disposition:  Did not require urgent referral





Consults


Time Called:  2349


Consulting Physician:  Dr. Alin New


Returned Call:  [phone]


I discussed the patient's case with Dr. Alin New. He is going to 

come in and evaluate the patient.





Impression





 Primary Impression:  


 Dislocation of hip joint prosthesis





Scribe Attestation


The scribe's documentation has been prepared under my direction and personally 

reviewed by me in its entirety. I confirm that the note above accurately 

reflects all work, treatment, procedures, and medical decision making performed 

by me.





Departure Information


Dispostion


Home / Self-Care





Referrals


Constantine Greenberg M.D. (PCP)





Forms


HOME CARE DOCUMENTATION FORM,                                                 

               IMPORTANT VISIT INFORMATION, WORK / SCHOOL INSTRUCTIONS





Patient Instructions


My [facility]





Additional Instructions





Rest.





Wear knee immobilizer at all times.





Follow up with Dr. Velazquez





Problem Qualifiers








 Primary Impression:  


 Dislocation of hip joint prosthesis


 Encounter type:  initial encounter  Qualified Codes:  T84.029A - Dislocation 

of unspecified internal joint prosthesis, initial encounter; Z96.649 - Presence 

of unspecified artificial hip joint

## 2018-01-10 NOTE — DIAGNOSTIC IMAGING REPORT
R HIP UNILATERAL 2 VIEWS



CLINICAL HISTORY: right hip dislocation   



COMPARISON STUDY:  Right hip 11/12/2017.



FINDINGS: There is a right total hip arthroplasty. There is posterior/superior

dislocation of the femoral prosthesis in relation to the acetabular cup. No

fractures identified.



IMPRESSION:  Right total arthroplasty with posterior/superior dislocation of the

femoral head component in relation to the acetabular cup. 









Electronically signed by:  Negro Rankin M.D.

1/10/2018 7:13 AM



Dictated Date/Time:  1/10/2018 7:11 AM

## 2018-01-17 ENCOUNTER — HOSPITAL ENCOUNTER (OUTPATIENT)
Dept: HOSPITAL 45 - C.CPL | Age: 68
Discharge: HOME | End: 2018-01-17
Attending: PHYSICIAN ASSISTANT
Payer: COMMERCIAL

## 2018-01-17 DIAGNOSIS — Z01.812: ICD-10-CM

## 2018-01-17 DIAGNOSIS — Z01.810: Primary | ICD-10-CM

## 2018-01-17 LAB
ALBUMIN SERPL-MCNC: 3.5 GM/DL (ref 3.4–5)
BASOPHILS # BLD: 0.04 K/UL (ref 0–0.2)
BASOPHILS NFR BLD: 0.6 %
BUN SERPL-MCNC: 14 MG/DL (ref 7–18)
CALCIUM SERPL-MCNC: 9.6 MG/DL (ref 8.5–10.1)
CO2 SERPL-SCNC: 27 MMOL/L (ref 21–32)
CREAT SERPL-MCNC: 1.02 MG/DL (ref 0.6–1.4)
EOS ABS #: 0.49 K/UL (ref 0–0.5)
EOSINOPHIL NFR BLD AUTO: 246 K/UL (ref 130–400)
GLUCOSE SERPL-MCNC: 97 MG/DL (ref 70–99)
HCT VFR BLD CALC: 44.2 % (ref 42–52)
HGB BLD-MCNC: 14.6 G/DL (ref 14–18)
IG#: 0.01 K/UL (ref 0–0.02)
IMM GRANULOCYTES NFR BLD AUTO: 22 %
INR PPP: 0.9 (ref 0.9–1.1)
LYMPHOCYTES # BLD: 1.43 K/UL (ref 1.2–3.4)
MCH RBC QN AUTO: 29.4 PG (ref 25–34)
MCHC RBC AUTO-ENTMCNC: 33 G/DL (ref 32–36)
MCV RBC AUTO: 89.1 FL (ref 80–100)
MONO ABS #: 0.78 K/UL (ref 0.11–0.59)
MONOCYTES NFR BLD: 12 %
NEUT ABS #: 3.74 K/UL (ref 1.4–6.5)
NEUTROPHILS # BLD AUTO: 7.6 %
NEUTROPHILS NFR BLD AUTO: 57.6 %
PMV BLD AUTO: 9.6 FL (ref 7.4–10.4)
POTASSIUM SERPL-SCNC: 4.1 MMOL/L (ref 3.5–5.1)
PTT PATIENT: 25.7 SECONDS (ref 21–31)
RED CELL DISTRIBUTION WIDTH CV: 15.4 % (ref 11.5–14.5)
RED CELL DISTRIBUTION WIDTH SD: 50.3 FL (ref 36.4–46.3)
SODIUM SERPL-SCNC: 143 MMOL/L (ref 136–145)
WBC # BLD AUTO: 6.49 K/UL (ref 4.8–10.8)

## 2018-01-17 NOTE — DIAGNOSTIC IMAGING REPORT
CHEST 2 VIEWS ROUTINE



CLINICAL HISTORY: Preoperative chest    



COMPARISON STUDY:  12/9/2016



FINDINGS: The cardiac and mediastinal contours are normal. There is no evidence

of focal pulmonary consolidation. There is no evidence of failure. No pleural

effusions are visualized.[ 



IMPRESSION: No active disease in the chest.







Electronically signed by:  Santi Amador M.D.

1/17/2018 3:42 PM



Dictated Date/Time:  1/17/2018 3:41 PM

## 2018-01-17 NOTE — PROCEDURE NOTE
Procedure Note


Date of Service


Jan 17, 2018.





Procedure Note


After the patient received adequate anesthesia, leg lengths were assessed and 

the RLE was found to be short and internally rotated.  Gentle traction, with 

flexion, adduction off the hip and a combination of internal and external 

rotation was applied to the right leg.  A appreciable clunk was felt and leg 

lengths were assessed once more and found to be equal.  The patient tolerated 

the procedure well.

## 2018-01-18 LAB — HBA1C MFR BLD: 5.3 % (ref 4.5–5.6)

## 2018-01-24 NOTE — HISTORY & PHYSICAL EXAMINATION
DATE OF ADMISSION:  01/25/2018

 

CHIEF COMPLAINT:  Right total hip instability.

 

HISTORY OF PRESENT ILLNESS:  The patient is a 67-year-old male approximately

10 weeks status post right total hip arthroplasty.  Since that time, he has

had 2 hip dislocations and subsequent reductions.  Due to the ongoing

instability, the decision was made to schedule him for a revision hip

arthroplasty.

 

PAST MEDICAL HISTORY:  Hypercholesterolemia and prostate cancer.

 

PAST SURGICAL HISTORY:  Prostate surgery, right knee replacement, right hip

as above.

 

MEDICATIONS:  Lipitor 40 mg daily, Zetia 10 mg daily, Flomax 0.4 mg daily,

aspirin 81 mg daily, fish oil 1000 mg twice daily, vitamin C 500 mg twice

daily, glucosamine and chondroitin daily, multivitamin daily, CoQ10 daily.

 

ALLERGIES:  INCLUDE PENICILLIN, SULFA, AND IODINE.

 

SOCIAL HISTORY AND REVIEW OF SYSTEMS:  Noncontributory.

 

PHYSICAL EXAMINATION:

GENERAL:  Well-nourished, well-developed male who appears his stated age.

HEENT:  Normocephalic, atraumatic, extraocular movements intact, oropharynx

pink and moist.

NECK:  Supple without adenopathy.

LUNGS:  Clear to auscultation bilaterally.

HEART:  Regular rate and rhythm.

ABDOMEN:  Soft, nontender, nondistended.

EXTREMITIES:  The upper extremities are within normal limits.  The right hip

has a well-healed lateral incision from his previous arthroplasty.  The hip

is mobile without pain when patient is just resting.

 

X-RAYS:  X-rays were reviewed.  He has a total hip in place.  The components

appear relatively well aligned and well fixed.  The hip arthroplasty may be

slightly shorter than the uninvolved left hip.

 

ASSESSMENT:  Recurrent hip instability, right total hip.

 

PLAN:  Risks versus benefits were discussed.  Consent was obtained.  The

patient's primary care physician is Dr. Constantine Greenberg.  We will proceed with a

right total hip acetabular revision to [place] dual mobility component versus

a constrained acetabulum.

## 2018-01-25 ENCOUNTER — HOSPITAL ENCOUNTER (INPATIENT)
Dept: HOSPITAL 45 - C.ACU | Age: 68
LOS: 2 days | Discharge: HOME HEALTH | DRG: 468 | End: 2018-01-27
Payer: COMMERCIAL

## 2018-01-25 VITALS
TEMPERATURE: 96.62 F | DIASTOLIC BLOOD PRESSURE: 66 MMHG | OXYGEN SATURATION: 96 % | SYSTOLIC BLOOD PRESSURE: 115 MMHG | HEART RATE: 61 BPM

## 2018-01-25 VITALS — HEART RATE: 62 BPM | DIASTOLIC BLOOD PRESSURE: 60 MMHG | OXYGEN SATURATION: 98 % | SYSTOLIC BLOOD PRESSURE: 122 MMHG

## 2018-01-25 VITALS
DIASTOLIC BLOOD PRESSURE: 72 MMHG | TEMPERATURE: 97.88 F | HEART RATE: 80 BPM | SYSTOLIC BLOOD PRESSURE: 128 MMHG | OXYGEN SATURATION: 96 %

## 2018-01-25 VITALS
HEART RATE: 60 BPM | TEMPERATURE: 97.52 F | DIASTOLIC BLOOD PRESSURE: 75 MMHG | SYSTOLIC BLOOD PRESSURE: 118 MMHG | OXYGEN SATURATION: 96 %

## 2018-01-25 VITALS
DIASTOLIC BLOOD PRESSURE: 66 MMHG | SYSTOLIC BLOOD PRESSURE: 120 MMHG | TEMPERATURE: 97.88 F | OXYGEN SATURATION: 94 % | HEART RATE: 79 BPM

## 2018-01-25 VITALS
HEART RATE: 71 BPM | DIASTOLIC BLOOD PRESSURE: 71 MMHG | TEMPERATURE: 98.06 F | SYSTOLIC BLOOD PRESSURE: 124 MMHG | OXYGEN SATURATION: 98 %

## 2018-01-25 VITALS
WEIGHT: 222.47 LBS | WEIGHT: 222.47 LBS | HEIGHT: 71 IN | BODY MASS INDEX: 31.15 KG/M2 | HEIGHT: 71 IN | BODY MASS INDEX: 31.15 KG/M2 | BODY MASS INDEX: 31.15 KG/M2

## 2018-01-25 VITALS
OXYGEN SATURATION: 96 % | DIASTOLIC BLOOD PRESSURE: 74 MMHG | TEMPERATURE: 97.7 F | SYSTOLIC BLOOD PRESSURE: 113 MMHG | HEART RATE: 62 BPM

## 2018-01-25 VITALS — OXYGEN SATURATION: 96 %

## 2018-01-25 DIAGNOSIS — T84.020A: Primary | ICD-10-CM

## 2018-01-25 DIAGNOSIS — Z96.641: ICD-10-CM

## 2018-01-25 DIAGNOSIS — Z96.651: ICD-10-CM

## 2018-01-25 DIAGNOSIS — Z79.82: ICD-10-CM

## 2018-01-25 DIAGNOSIS — Z79.899: ICD-10-CM

## 2018-01-25 DIAGNOSIS — Z85.46: ICD-10-CM

## 2018-01-25 DIAGNOSIS — E66.9: ICD-10-CM

## 2018-01-25 DIAGNOSIS — Z88.2: ICD-10-CM

## 2018-01-25 DIAGNOSIS — E78.00: ICD-10-CM

## 2018-01-25 DIAGNOSIS — Z88.0: ICD-10-CM

## 2018-01-25 PROCEDURE — 0SPR0JZ REMOVAL OF SYNTHETIC SUBSTITUTE FROM RIGHT HIP JOINT, FEMORAL SURFACE, OPEN APPROACH: ICD-10-PCS

## 2018-01-25 PROCEDURE — 0SRA0JZ REPLACEMENT OF RIGHT HIP JOINT, ACETABULAR SURFACE WITH SYNTHETIC SUBSTITUTE, OPEN APPROACH: ICD-10-PCS

## 2018-01-25 PROCEDURE — 0SRR0JZ REPLACEMENT OF RIGHT HIP JOINT, FEMORAL SURFACE WITH SYNTHETIC SUBSTITUTE, OPEN APPROACH: ICD-10-PCS

## 2018-01-25 PROCEDURE — 0SP909Z REMOVAL OF LINER FROM RIGHT HIP JOINT, OPEN APPROACH: ICD-10-PCS

## 2018-01-25 RX ADMIN — DOCUSATE SODIUM SCH MG: 100 CAPSULE, LIQUID FILLED ORAL at 21:28

## 2018-01-25 RX ADMIN — KETOROLAC TROMETHAMINE SCH MG: 15 INJECTION INTRAMUSCULAR; INTRAVENOUS at 21:30

## 2018-01-25 RX ADMIN — TRANEXAMIC ACID SCH MLS/MIN: 100 INJECTION, SOLUTION INTRAVENOUS at 11:24

## 2018-01-25 RX ADMIN — ATORVASTATIN CALCIUM SCH MG: 40 TABLET, FILM COATED ORAL at 21:29

## 2018-01-25 RX ADMIN — Medication SCH MG: at 21:29

## 2018-01-25 RX ADMIN — EZETIMIBE SCH MG: 10 TABLET ORAL at 21:29

## 2018-01-25 RX ADMIN — KETOROLAC TROMETHAMINE SCH MG: 15 INJECTION INTRAMUSCULAR; INTRAVENOUS at 16:43

## 2018-01-25 RX ADMIN — TRANEXAMIC ACID SCH MLS/MIN: 100 INJECTION, SOLUTION INTRAVENOUS at 16:41

## 2018-01-25 RX ADMIN — DEXTROSE MONOHYDRATE, SODIUM CHLORIDE, AND POTASSIUM CHLORIDE SCH MLS/HR: 50; 4.5; 1.49 INJECTION, SOLUTION INTRAVENOUS at 16:36

## 2018-01-25 RX ADMIN — ACETAMINOPHEN SCH MG: 500 TABLET, COATED ORAL at 18:32

## 2018-01-25 RX ADMIN — FERROUS GLUCONATE SCH MG: 324 TABLET ORAL at 18:33

## 2018-01-25 NOTE — [facility] POST OPERATIVE BRIEF NOTE
Immediate Operative Summary


Operative Date


Jan 25, 2018.





Pre-Operative Diagnosis





Right total hip instability.





Post-Operative Diagnosis





Right total hip instability.





Procedure(s) Performed





Conversion farhan to dual mobility hip





Surgeon


Dr. Jessica





Assistant Surgeon(s)


Gurpreet Jenkins





Estimated Blood Loss


100cc





Findings


Consistent with Post-Op Diagnosis





Specimens





Microbiology











1.Right hip fluids-for C&S, anerobic, aerobic and gram stain.





Anesthesia Type


[facility]





Disposition


Accompanied Pt To Recover:  no


Disposition:  Recovery Room / PACU

## 2018-01-25 NOTE — OPERATIVE REPORT
DATE OF OPERATION:  01/25/2018

 

PREOPERATIVE DIAGNOSIS:  Instability total hip on the right.

 

POSTOPERATIVE DIAGNOSIS:  Instability total hip on the right.

 

PROCEDURE:  Revision femoral head and acetabular liner from a standard total

hip to a dual mobility hip.

 

SURGEON:  Elias Jessica MD

 

ASSISTANT:  Gurpreet Tarango PA-C.

 

ANESTHESIA:  Spinal.

 

COMPLICATIONS:  None.

 

DESCRIPTION OF PROCEDURE:  Following induction of adequate spinal anesthesia,

the patient was examined under anesthesia and the hip was found to be grossly

unstable with an excessive amount of shuck.  The right hip was prepped and

draped in usual sterile manner.  A previously made incision was reopened. 

Subcutaneous tissue was sharply dissected.  Electrocautery was used for

hemostasis.  The fascia was incised throughout the length of the wound and a

copious nonpurulent appearing joint fluid was noted.  This was cultured.  The

hip was dislocated and the femoral head was disimpacted, which was found to

be a -5 neck length.  The stem remained well fixed.  Attention was turned to

the acetabulum where significant scarring about the acetabulum was removed

using a combination of sharp dissection and electrocautery.  Retractors were

used to expose the acetabulum and the acetabular liner was removed.  The

acetabular shell was well positioned and well fixed and was not removed.  A

trial reduction using the dual mobility liner was carried out and using

various neck lengths, a +12 neck length gave excellent stability.  The

decision was made to go with the dual mobility cup as opposed to a

constrained cup and head.  The trial was dislocated.  The hip was thoroughly

irrigated and the final dual mobility cup was impacted into position.  The

dual mobility femoral heads were compressed and impacted on the back table

and then impacted onto the femoral neck after cleaning the Meza taper.  The

hip was reduced.  Again, there was a minor degree of shuck and excellent

stability on all planes.  The wound was irrigated and a Hemovac drain was

placed.  Fascia was closed using #1 Vicryl, subcutaneous tissue was closed

with 0 Dexon, and skin was closed ____.  Sterile dressing of Adaptic, 4x4's,

ABDs and foam tape was applied.  The patient tolerated the procedure well.

 

 

I attest to the content of the Intraoperative Record and any orders documented therein. Any exception
s are noted below.

## 2018-01-25 NOTE — DIAGNOSTIC IMAGING REPORT
R PELVIS/UNILATERAL HIP 1 VIEW



CLINICAL HISTORY: IN PACU - A/P PELVIS and LATERAL HIP INCLUDING ALL OF IMPLANT

postoperative evaluation



COMPARISON: 11/9/2017



DISCUSSION: Total right hip arthroplasty. Good contact between prosthetic and

underlying bone. Surgical drains in position.



Mild degenerative change left hip. Expected soft tissue postoperative change



IMPRESSION: Anatomic alignment status post total right hip

arthroplasty/revision.











The above report was generated using voice recognition software.  It may contain

grammatical, syntax or spelling errors.







Electronically signed by:  Rohith Stark M.D.

1/25/2018 2:12 PM



Dictated Date/Time:  1/25/2018 2:11 PM

## 2018-01-26 VITALS
SYSTOLIC BLOOD PRESSURE: 132 MMHG | DIASTOLIC BLOOD PRESSURE: 72 MMHG | OXYGEN SATURATION: 96 % | TEMPERATURE: 97.88 F | HEART RATE: 79 BPM

## 2018-01-26 VITALS
TEMPERATURE: 98.06 F | SYSTOLIC BLOOD PRESSURE: 122 MMHG | DIASTOLIC BLOOD PRESSURE: 72 MMHG | OXYGEN SATURATION: 96 % | HEART RATE: 79 BPM

## 2018-01-26 VITALS
TEMPERATURE: 97.52 F | OXYGEN SATURATION: 95 % | DIASTOLIC BLOOD PRESSURE: 78 MMHG | HEART RATE: 74 BPM | SYSTOLIC BLOOD PRESSURE: 148 MMHG

## 2018-01-26 VITALS
TEMPERATURE: 97.88 F | OXYGEN SATURATION: 99 % | HEART RATE: 80 BPM | DIASTOLIC BLOOD PRESSURE: 74 MMHG | SYSTOLIC BLOOD PRESSURE: 137 MMHG

## 2018-01-26 VITALS
SYSTOLIC BLOOD PRESSURE: 153 MMHG | DIASTOLIC BLOOD PRESSURE: 72 MMHG | OXYGEN SATURATION: 99 % | HEART RATE: 79 BPM | TEMPERATURE: 98.24 F

## 2018-01-26 LAB
BASOPHILS # BLD: 0.01 K/UL (ref 0–0.2)
BASOPHILS NFR BLD: 0.1 %
BUN SERPL-MCNC: 16 MG/DL (ref 7–18)
CALCIUM SERPL-MCNC: 8.9 MG/DL (ref 8.5–10.1)
CO2 SERPL-SCNC: 25 MMOL/L (ref 21–32)
CREAT SERPL-MCNC: 0.95 MG/DL (ref 0.6–1.4)
EOS ABS #: 0 K/UL (ref 0–0.5)
EOSINOPHIL NFR BLD AUTO: 225 K/UL (ref 130–400)
GLUCOSE SERPL-MCNC: 146 MG/DL (ref 70–99)
HCT VFR BLD CALC: 39 % (ref 42–52)
HGB BLD-MCNC: 12.9 G/DL (ref 14–18)
IG#: 0.02 K/UL (ref 0–0.02)
IMM GRANULOCYTES NFR BLD AUTO: 10.3 %
LYMPHOCYTES # BLD: 1.24 K/UL (ref 1.2–3.4)
MCH RBC QN AUTO: 28.9 PG (ref 25–34)
MCHC RBC AUTO-ENTMCNC: 33.1 G/DL (ref 32–36)
MCV RBC AUTO: 87.4 FL (ref 80–100)
MONO ABS #: 0.65 K/UL (ref 0.11–0.59)
MONOCYTES NFR BLD: 5.4 %
NEUT ABS #: 10.09 K/UL (ref 1.4–6.5)
NEUTROPHILS # BLD AUTO: 0 %
NEUTROPHILS NFR BLD AUTO: 84 %
PMV BLD AUTO: 9.4 FL (ref 7.4–10.4)
POTASSIUM SERPL-SCNC: 4.5 MMOL/L (ref 3.5–5.1)
RED CELL DISTRIBUTION WIDTH CV: 15 % (ref 11.5–14.5)
RED CELL DISTRIBUTION WIDTH SD: 47.8 FL (ref 36.4–46.3)
SODIUM SERPL-SCNC: 139 MMOL/L (ref 136–145)
WBC # BLD AUTO: 12.01 K/UL (ref 4.8–10.8)

## 2018-01-26 RX ADMIN — PANTOPRAZOLE SCH MG: 40 TABLET, DELAYED RELEASE ORAL at 08:25

## 2018-01-26 RX ADMIN — FERROUS GLUCONATE SCH MG: 324 TABLET ORAL at 13:31

## 2018-01-26 RX ADMIN — ACETAMINOPHEN SCH MG: 500 TABLET, COATED ORAL at 11:00

## 2018-01-26 RX ADMIN — KETOROLAC TROMETHAMINE SCH MG: 15 INJECTION INTRAMUSCULAR; INTRAVENOUS at 03:40

## 2018-01-26 RX ADMIN — DEXTROSE MONOHYDRATE, SODIUM CHLORIDE, AND POTASSIUM CHLORIDE SCH MLS/HR: 50; 4.5; 1.49 INJECTION, SOLUTION INTRAVENOUS at 03:41

## 2018-01-26 RX ADMIN — FERROUS GLUCONATE SCH MG: 324 TABLET ORAL at 18:03

## 2018-01-26 RX ADMIN — Medication SCH MG: at 20:45

## 2018-01-26 RX ADMIN — EZETIMIBE SCH MG: 10 TABLET ORAL at 20:44

## 2018-01-26 RX ADMIN — DEXTROSE MONOHYDRATE, SODIUM CHLORIDE, AND POTASSIUM CHLORIDE SCH MLS/HR: 50; 4.5; 1.49 INJECTION, SOLUTION INTRAVENOUS at 13:31

## 2018-01-26 RX ADMIN — Medication SCH MG: at 08:25

## 2018-01-26 RX ADMIN — FERROUS GLUCONATE SCH MG: 324 TABLET ORAL at 08:25

## 2018-01-26 RX ADMIN — Medication SCH TAB: at 08:25

## 2018-01-26 RX ADMIN — ACETAMINOPHEN SCH MG: 500 TABLET, COATED ORAL at 18:03

## 2018-01-26 RX ADMIN — ATORVASTATIN CALCIUM SCH MG: 40 TABLET, FILM COATED ORAL at 21:48

## 2018-01-26 RX ADMIN — DOCUSATE SODIUM SCH MG: 100 CAPSULE, LIQUID FILLED ORAL at 08:25

## 2018-01-26 RX ADMIN — DOCUSATE SODIUM SCH MG: 100 CAPSULE, LIQUID FILLED ORAL at 20:45

## 2018-01-26 RX ADMIN — KETOROLAC TROMETHAMINE SCH MG: 15 INJECTION INTRAMUSCULAR; INTRAVENOUS at 11:00

## 2018-01-26 RX ADMIN — OXYCODONE HYDROCHLORIDE PRN MG: 5 TABLET ORAL at 13:41

## 2018-01-26 RX ADMIN — ACETAMINOPHEN SCH MG: 500 TABLET, COATED ORAL at 01:47

## 2018-01-26 NOTE — ANESTHESIOLOGY PROGRESS NOTE
Anesthesia Post Op Note


Date & Time


Jan 26, 2018 at 09:49





Vital Signs


Pain Intensity:  0.0





Vital Signs Past 12 Hours








  Date Time  Temp Pulse Resp B/P (MAP) Pulse Ox O2 Delivery O2 Flow Rate FiO2


 


1/26/18 07:45 36.4 74 16 148/78 (101) 95 Room Air  


 


1/26/18 03:39 36.8 79 18 153/72 (99) 99 Room Air  


 


1/26/18 00:05      Room Air  


 


1/25/18 22:50 36.6 79 16 120/66 (84) 94 Room Air  


 


1/25/18 22:38     96 Room Air  











Notes


Mental Status:  alert / awake / arousable, participated in evaluation


Pt Amnestic to Procedure:  Yes


Nausea / Vomiting:  adequately controlled


Pain:  adequately controlled


Airway Patency, RR, SpO2:  stable & adequate


BP & HR:  stable & adequate


Hydration State:  stable & adequate


Neuraxial Anesthesia:  was administered, sensory block resolved


Anesthetic Complications:  no major complications apparent

## 2018-01-26 NOTE — ORTHOPEDIC PROGRESS NOTE
Orthopedic Progress Note


Date of Service


Jan 26, 2018.





Subjective


Post OP Day:  1


Reports: feeling well





Objective


calves soft nontender, N/V intact, dressing C/D/I (Hemovac d/c'd), toes mobile











  Date Time  Temp Pulse Resp B/P (MAP) Pulse Ox O2 Delivery O2 Flow Rate FiO2


 


1/26/18 03:39 36.8 79 18 153/72 (99) 99 Room Air  


 


1/26/18 00:05      Room Air  


 


1/25/18 22:50 36.6 79 16 120/66 (84) 94 Room Air  


 


1/25/18 22:38     96 Room Air  


 


1/25/18 19:32 36.6 80 16 128/72 (90) 96  2.0 


 


1/25/18 16:47 36.4 60 16 118/75 (89) 96  2.0 


 


1/25/18 15:56  62 16 122/60 (80) 98  2.0 


 


1/25/18 15:39      Nasal Cannula 2.0 


 


1/25/18 15:15 35.9 61 16 115/66 (82) 96 Nasal Cannula 2.0 


 


1/25/18 15:15      Nasal Cannula 2.0 


 


1/25/18 14:50 36.5 62 14 113/74 (87) 96 Nasal Cannula 2.0 


 


1/25/18 14:25 36.5 67 16 107/71 99 Nasal Cannula 2 


 


1/25/18 14:10 36.5 64 16 112/68 95 Nasal Cannula 2 


 


1/25/18 13:55  61 16 117/73 98 Nasal Cannula 2 


 


1/25/18 13:45  61 16 125/67 100 Nasal Cannula 2 


 


1/25/18 13:35  60 16 114/69 100 Oxymask 10 


 


1/25/18 13:25  62 16 108/65 100 Oxymask 10 


 


1/25/18 13:15 36 62 12 116/63 100 Oxymask 10 


 


1/25/18 09:39 36.7 71 18 124/71 98   








Laboratory Results 24 Hours:











Test


  1/26/18


06:45


 


White Blood Count 12.01 K/uL 


 


Red Blood Count 4.46 M/uL 


 


Hemoglobin 12.9 g/dL 


 


Hematocrit 39.0 % 


 


Mean Corpuscular Volume 87.4 fL 


 


Mean Corpuscular Hemoglobin 28.9 pg 


 


Mean Corpuscular Hemoglobin


Concent 33.1 g/dl 


 


 


Platelet Count 225 K/uL 


 


Mean Platelet Volume 9.4 fL 


 


Neutrophils (%) (Auto) 84.0 % 


 


Lymphocytes (%) (Auto) 10.3 % 


 


Monocytes (%) (Auto) 5.4 % 


 


Eosinophils (%) (Auto) 0.0 % 


 


Basophils (%) (Auto) 0.1 % 


 


Neutrophils # (Auto) 10.09 K/uL 


 


Lymphocytes # (Auto) 1.24 K/uL 


 


Monocytes # (Auto) 0.65 K/uL 


 


Eosinophils # (Auto) 0.00 K/uL 


 


Basophils # (Auto) 0.01 K/uL 











Assessment & Plan


Assessment:


68 yo male stable POD #1 s/p right STEPHANIE acetabular liner revision to dual 

mobility liner


Plan:


1.  Med management


2.  DVT prophylaxis- ASA, SCDs


3.  PT/OT


4.  D/C planning- home w/ HH

## 2018-01-26 NOTE — DISCHARGE INSTRUCTIONS
Discharge Instructions


Date of Service


Jan 26, 2018.





Admission


Reason for Admission:  Recurrent Right Hip Dislocation





Discharge


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





Discharge Goals


Goal(s):  Decrease discomfort, Improve function





Activity Recommendations


Activity Limitations:  as noted below


Weightbearing Status:  Right weightbearing (as tolerated)





.





Instructions / Follow-Up


Instructions / Follow-Up


ACTIVITY RECOMMENDATIONS:





SELF CARE INSTRUCTIONS AFTER TOTAL HIP REPLACEMENT





Until the incision and soft tissues around your hip have healed, there is


a possibility that the hip prosthesis could dislocate.





A.  Observe the following precautions to prevent dislocation:


   1.  Don't bend your hip greater than 90 degrees.


   2.  Avoid crossing your legs or ankles while standing or lying.


   3.  Sit with your feet placed 6 inches apart.


   4.  When sitting, keep your knees below your hips.  Sit on a firm 


        surface, avoid deep, soft chairs and couches.  Use an elevated


        toilet seat in the bathroom.


   5.  Don't bend over at the waist.  Use a long handled shoehorn and


        a sock aid to help you put on your shoes and socks.  A reacher


        can help you  objects that are too high or too low to reach.


   6.  Keep car riding to a minimum for at least one month after surgery.





B.  Your balance may be shaky for a while.  Use crutches or a walker until 


     directed by your doctor.





C.  Use hand rails when walking on stairs.





D.  Wear low heeled shoes with non-slip soles.





E.  Be sure that your floors are free of things that could trip you - throw rugs

,


     electrical cords, small objects.  Avoid wet and waxed floors, especially 

with


     crutches and canes.





F.  Try to walk several times a day with rest periods between.





G.  Continue with all the exercises taught to you in the hospital.  Again, make 


     walking a part of your daily routine.








SPECIAL CARE INSTRUCTIONS:





**VERY IMPORTANT TO READ AND REVIEW**





A.  You may still be at risk for phlebitis and blood clots.





   1.  Wear surgical stockings (BASSEM hose) for 2 weeks after surgery to improve


        circulation and reduce swelling.


   2.  Take Aspirin 81mg twice daily for 4 weeks or as directed by your doctor.

  This


                 is your blood thinner.


   3.  High risk patients may be prescribed a stronger blood thinner if 

necessary.


   4.  If you are on Coumadin normally, your family doctor/cardiologist should 

monitor 


                 your blood work.  Expect a phone call the day of or the day 

after bloodwork is


                 drawn to adjust your dosage.





B.  You must take antibiotics before having dental work, bladder, bowel and 

other


     surgery.  Your doctor will provide you with a permanent card to carry 

describing 


     precautions.  





C.  Call [facility] if you have a fever, redness or swelling 

around


     the incision, cloudy drainage from incision, or sudden increase in pain in 

your hip, not


     relieved by your regular pain medication.  





D.  Please call the office at [phone] if you have any concerns or 

questions about


     your operation or recovery.





*  YOU MAY SHOWER, NO TUB BATHS UNTIL CLEARED BY YOUR DOCTOR.





*  WEAR BASSEM HOSE 20 HOURS PER DAY FOR 2 WEEKS.





*  YOU SHOULD USE A WALKER OR CRUTCHES FOR 2-4 WEEKS.  THIS WILL HELP PREVENT


    STRAIN ON YOUR HIP MUSCLE AND ALLOW IT TO HEAL PROPERLY. YOU MAY WEAN TO A


    CANE AS TOLERATED.





*  MOST PATIENTS WILL HAVE HOME NURSING FOR THERAPY.  IF YOU DECIDE TO DO 


   OUTPATIENT PHYSICAL THERAPY, PLEASE SCHEDULE THIS 3 TIMES PER WEEK.





Silverlon-  This is a large adhesive bandage that contains silver ions.  This 

helps your incision heal by fighting off bacteria and protecting it from the 

outside environment.  You are permitted to shower with this dressing.  This 

will remain on your incision for 7 days and then should be removed.  Some 

visible blood or drainage through the dressing window is normal.  If there is 

significant drainage or leaking noted before the 7 days notify your doctor's 

office immediately.  Once removed, keep incision clean and dry.  If there is 

any drainage or redness noted, please call your surgeon.


When removing silverlon, maintain zipline closure until follow-up with MD.





FOLLOW UP VISIT:





If appointment is not already scheduled:





Please call [facility] to make a follow-up appointment for 


2 weeks after your surgery at [phone].





Current Hospital Diet


Patient's current hospital diet: Regular Diet





Discharge Diet


Recommended Diet:  Regular Diet





Procedures


Procedures Performed:  


Conversion stephanie to dual mobility hip





Pending Studies


Studies pending at discharge:  no





Laboratory Results





Hemoglobin A1c








Test


  1/17/18


15:10 Range/Units


 


 


Estimated Average Glucose 105   mg/dl


 


Hemoglobin A1c 5.3  4.5-5.6  %











Medical Emergencies








.


Who to Call and When:





Medical Emergencies:  If at any time you feel your situation is an emergency, 

please call 911 immediately.





.





Non-Emergent Contact


Non-Emergency issues call your:  Surgeon


Call Non-Emergent contact if:  temperature is above 101.5, your pain is not 

controlled, wound has increased drainage, wound has increased redness


.








"Provider Documentation" section prepared by Gurpreet Tarango PA-C.








.





VTE Core Measure


Inpt VTE Proph given/why not?:  Other Anticoagulation (ASA), T.E.D. Stockings, 

SCD's





[place] Drug Monitoring Program


Search Results:  patient reviewed within database, no issues identified

## 2018-01-27 VITALS
OXYGEN SATURATION: 98 % | DIASTOLIC BLOOD PRESSURE: 73 MMHG | SYSTOLIC BLOOD PRESSURE: 129 MMHG | TEMPERATURE: 97.7 F | HEART RATE: 76 BPM

## 2018-01-27 VITALS
SYSTOLIC BLOOD PRESSURE: 129 MMHG | OXYGEN SATURATION: 98 % | HEART RATE: 76 BPM | DIASTOLIC BLOOD PRESSURE: 73 MMHG | TEMPERATURE: 97.7 F

## 2018-01-27 RX ADMIN — OXYCODONE HYDROCHLORIDE PRN MG: 5 TABLET ORAL at 11:55

## 2018-01-27 RX ADMIN — Medication SCH TAB: at 08:51

## 2018-01-27 RX ADMIN — Medication SCH MG: at 10:37

## 2018-01-27 RX ADMIN — FERROUS GLUCONATE SCH MG: 324 TABLET ORAL at 08:51

## 2018-01-27 RX ADMIN — DOCUSATE SODIUM SCH MG: 100 CAPSULE, LIQUID FILLED ORAL at 08:51

## 2018-01-27 RX ADMIN — PANTOPRAZOLE SCH MG: 40 TABLET, DELAYED RELEASE ORAL at 08:51

## 2018-01-27 RX ADMIN — ACETAMINOPHEN SCH MG: 500 TABLET, COATED ORAL at 01:57

## 2018-01-27 RX ADMIN — ACETAMINOPHEN SCH MG: 500 TABLET, COATED ORAL at 10:38

## 2018-01-27 NOTE — ORTHOPEDIC PROGRESS NOTE
Orthopedic Progress Note


Date of Service


Jan 27, 2018.





Subjective


Post OP Day:  2


Reports: feeling well, pain controlled w PO medications, Denies: chest pain, SOB

, light headedness, calf pain





Objective


calves soft nontender, N/V intact, hip located, capillary refill less than 2 

sec., dressing C/D/I, A&O x3, toes mobile











  Date Time  Temp Pulse Resp B/P (MAP) Pulse Ox O2 Delivery O2 Flow Rate FiO2


 


1/27/18 08:00      Room Air  


 


1/27/18 06:07 36.5 76 16 129/73 (91) 98 Room Air  


 


1/26/18 23:20 36.6 80 16 137/74 (95) 99 Room Air  


 


1/26/18 20:02      Room Air  


 


1/26/18 14:56 36.6 79 18 132/72 (92) 96 Room Air  


 


1/26/18 11:20 36.7 79 16 122/72 (89) 96 Room Air  











Assessment & Plan


Assessment:


68 yo male stable POD #2 s/p right STEPHANIE acetabular liner revision to dual 

mobility liner


Plan:


1.  Med management


2.  DVT prophylaxis- ASA, SCDs


3.  PT/OT


4.  D/C planning- home w/ HH on saturday








Inhouse Planning


Pain Management:  PO Tylenol, Oxy IR


DVT Prophylaxis:  TEDs, SCDs, ASA





Discharge Planning


Discharge Planning:  home with home health


Pain Management:  PO Tylenol, Oxy IR


DVT Prophylaxis:  TEDs, ASA


Therapy:  Physical Therapy

## 2019-11-01 NOTE — ORTHOPEDIC PROGRESS NOTE
Orthopedic Progress Note


Date of Service


Nov 13, 2017.





Subjective


Post OP Day:  1


Reports: feeling well, Denies: chest pain, SOB, nausea / vomiting, light 

headedness, calf pain





Objective


calves soft nontender, N/V intact, hip located, dressing C/D/I, A&O x3, toes 

mobile











  Date Time  Temp Pulse Resp B/P (MAP) Pulse Ox O2 Delivery O2 Flow Rate FiO2


 


11/13/17 03:50 36.6 68 16 124/75 (91) 95 Room Air  


 


11/12/17 23:30      Room Air  


 


11/12/17 23:00 36.5 74 18 133/79 (97) 96 Room Air  


 


11/12/17 20:01 36.3 71 18 118/69 (85) 97 Room Air  


 


11/12/17 15:55      Room Air  


 


11/12/17 15:42 36.6 65 18 123/74 (90) 98 Room Air  


 


11/12/17 14:45  64 16 119/76 (90) 97   


 


11/12/17 13:45  79 16 147/80 (102) 98   


 


11/12/17 13:15 36.7 77 16 146/77 (100) 96 Room Air  


 


11/12/17 12:46 36.4 64 14 145/81 (102) 99 Room Air  


 


11/12/17 12:45     99 Room Air  


 


11/12/17 12:25 36.8 67 16 127/69 95 Room Air  


 


11/12/17 12:15  65 16 112/62 100 Oxymask 10 


 


11/12/17 12:07 36.6 62 16 107/64 100 Oxymask 10 











Assessment & Plan


Assessment:


POD#1 SP CLOSED REDUCTION RIGHT STEPHANIE


Plan:


PAIN MANAGEMENT


RESUME PT


STRICT HIP PRECAUTIONS


DC HOME TODAY. no